# Patient Record
Sex: FEMALE | Race: WHITE | Employment: FULL TIME | URBAN - METROPOLITAN AREA
[De-identification: names, ages, dates, MRNs, and addresses within clinical notes are randomized per-mention and may not be internally consistent; named-entity substitution may affect disease eponyms.]

---

## 2021-08-03 ENCOUNTER — HOSPITAL ENCOUNTER (EMERGENCY)
Age: 46
Discharge: HOME | End: 2021-08-03
Payer: COMMERCIAL

## 2021-08-03 ENCOUNTER — APPOINTMENT (OUTPATIENT)
Dept: GENERAL RADIOLOGY | Age: 46
End: 2021-08-03
Payer: COMMERCIAL

## 2021-08-03 ENCOUNTER — APPOINTMENT (OUTPATIENT)
Dept: ULTRASOUND IMAGING | Age: 46
End: 2021-08-03
Payer: COMMERCIAL

## 2021-08-03 VITALS
RESPIRATION RATE: 16 BRPM | DIASTOLIC BLOOD PRESSURE: 89 MMHG | WEIGHT: 133 LBS | BODY MASS INDEX: 19.04 KG/M2 | HEIGHT: 70 IN | HEART RATE: 97 BPM | OXYGEN SATURATION: 100 % | SYSTOLIC BLOOD PRESSURE: 146 MMHG | TEMPERATURE: 97.3 F

## 2021-08-03 DIAGNOSIS — M54.16 LUMBAR RADICULOPATHY: ICD-10-CM

## 2021-08-03 DIAGNOSIS — A59.9 TRICHOMONAS INFECTION: ICD-10-CM

## 2021-08-03 DIAGNOSIS — M54.32 SCIATICA OF LEFT SIDE: Primary | ICD-10-CM

## 2021-08-03 DIAGNOSIS — M51.36 DEGENERATIVE LUMBAR DISC: ICD-10-CM

## 2021-08-03 DIAGNOSIS — S39.012A STRAIN OF LUMBAR REGION, INITIAL ENCOUNTER: ICD-10-CM

## 2021-08-03 LAB
BACTERIA: ABNORMAL /HPF
BILIRUBIN URINE: NEGATIVE
BLOOD, URINE: NEGATIVE
CLARITY: ABNORMAL
COLOR: YELLOW
EPITHELIAL CELLS, UA: ABNORMAL /HPF
GLUCOSE URINE: NEGATIVE MG/DL
HCG, URINE, POC: NEGATIVE
KETONES, URINE: 15 MG/DL
LEUKOCYTE ESTERASE, URINE: ABNORMAL
Lab: NORMAL
NEGATIVE QC PASS/FAIL: NORMAL
NITRITE, URINE: NEGATIVE
PH UA: 5.5 (ref 5–9)
POSITIVE QC PASS/FAIL: NORMAL
PROTEIN UA: NEGATIVE MG/DL
RBC UA: ABNORMAL /HPF (ref 0–2)
SPECIFIC GRAVITY UA: 1.02 (ref 1–1.03)
TRICHOMONAS: PRESENT /HPF
UROBILINOGEN, URINE: 0.2 E.U./DL
WBC UA: >20 /HPF (ref 0–5)

## 2021-08-03 PROCEDURE — 72100 X-RAY EXAM L-S SPINE 2/3 VWS: CPT

## 2021-08-03 PROCEDURE — 6360000002 HC RX W HCPCS: Performed by: NURSE PRACTITIONER

## 2021-08-03 PROCEDURE — 93971 EXTREMITY STUDY: CPT

## 2021-08-03 PROCEDURE — 87491 CHLMYD TRACH DNA AMP PROBE: CPT

## 2021-08-03 PROCEDURE — 99284 EMERGENCY DEPT VISIT MOD MDM: CPT

## 2021-08-03 PROCEDURE — 81001 URINALYSIS AUTO W/SCOPE: CPT

## 2021-08-03 PROCEDURE — 73502 X-RAY EXAM HIP UNI 2-3 VIEWS: CPT

## 2021-08-03 PROCEDURE — 96372 THER/PROPH/DIAG INJ SC/IM: CPT

## 2021-08-03 PROCEDURE — 87591 N.GONORRHOEAE DNA AMP PROB: CPT

## 2021-08-03 PROCEDURE — 87088 URINE BACTERIA CULTURE: CPT

## 2021-08-03 RX ORDER — METRONIDAZOLE 500 MG/1
500 TABLET ORAL 2 TIMES DAILY
Qty: 14 TABLET | Refills: 0 | Status: SHIPPED | OUTPATIENT
Start: 2021-08-03 | End: 2021-08-10

## 2021-08-03 RX ORDER — KETOROLAC TROMETHAMINE 30 MG/ML
30 INJECTION, SOLUTION INTRAMUSCULAR; INTRAVENOUS ONCE
Status: COMPLETED | OUTPATIENT
Start: 2021-08-03 | End: 2021-08-03

## 2021-08-03 RX ORDER — METRONIDAZOLE 500 MG/1
2000 TABLET ORAL ONCE
Status: DISCONTINUED | OUTPATIENT
Start: 2021-08-03 | End: 2021-08-03

## 2021-08-03 RX ORDER — NAPROXEN 500 MG/1
500 TABLET ORAL 2 TIMES DAILY WITH MEALS
Qty: 10 TABLET | Refills: 0 | Status: SHIPPED | OUTPATIENT
Start: 2021-08-03 | End: 2021-08-08

## 2021-08-03 RX ORDER — METHYLPREDNISOLONE 4 MG/1
TABLET ORAL
Qty: 1 KIT | Refills: 0 | Status: SHIPPED | OUTPATIENT
Start: 2021-08-03

## 2021-08-03 RX ORDER — LIDOCAINE 50 MG/G
1 PATCH TOPICAL EVERY 24 HOURS
Qty: 7 PATCH | Refills: 0 | Status: SHIPPED | OUTPATIENT
Start: 2021-08-03 | End: 2021-08-10

## 2021-08-03 RX ORDER — ORPHENADRINE CITRATE 30 MG/ML
60 INJECTION INTRAMUSCULAR; INTRAVENOUS ONCE
Status: COMPLETED | OUTPATIENT
Start: 2021-08-03 | End: 2021-08-03

## 2021-08-03 RX ADMIN — KETOROLAC TROMETHAMINE 30 MG: 30 INJECTION, SOLUTION INTRAMUSCULAR; INTRAVENOUS at 20:41

## 2021-08-03 RX ADMIN — ORPHENADRINE CITRATE 60 MG: 60 INJECTION INTRAMUSCULAR; INTRAVENOUS at 20:43

## 2021-08-03 ASSESSMENT — PAIN DESCRIPTION - PAIN TYPE: TYPE: ACUTE PAIN

## 2021-08-03 ASSESSMENT — PAIN SCALES - GENERAL
PAINLEVEL_OUTOF10: 5
PAINLEVEL_OUTOF10: 5

## 2021-08-03 NOTE — ED PROVIDER NOTES
[facility]  Department of Emergency Medicine   ED  Encounter Note  Admit Date/RoomTime: 8/3/2021  6:41 PM  ED Room:     NAME: Connie Matthews  : 1975  MRN: [id number]     Chief Complaint:  Hip Pain (left hip pain, calf pain x2 days )    History of Present Illness       Connie Matthews is a 41 y.o. old female with a prior history of recurrent intermittent self limited episodes of low back pain, presents to the emergency department by private vehicle, for non-traumatic acute on chronic, aching left sided lower lumbar spine pain with radiation, to the left lower leg, for 2 day(s) prior to arrival.  There has been no recent injury as it relates to today's visit, has been working long hours at work. Since onset the symptoms have been remaining constant and is moderate in severity. She has associated signs & symptoms of nothing additional.   She denies any bladder or bowel incontinence , new weakness, tingling or paresthesias, recent back injection, recent spinal surgery, recent spinal/chiropractic manipulation, history of IVDA, fever, abdominal pain, bladder incontinence, bowel incontinence, bladder retention, bladder urgency, bowel urgency, saddle paresthesias , sacral numbness or burning, numbness, tingling, dysuria, hematuria or urinary frequency. The pain is aggraveated by any movement and relieved by nothing in particular. She is not currently enrolled in an pain management program or managed by PCP or back specialist.  She denies any shortness breath, chest pain, lightheaded, dizziness or any trauma. 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  ROS   Pertinent positives and negatives are stated within HPI, all other systems reviewed and are negative. Past Medical History:  has a past medical history of Bronchitis and Sinusitis.     Surgical History:  has a past surgical history that includes  section. Social History:  reports that she has been smoking cigarettes. She has been smoking about 0.75 packs per day. She does not have any smokeless tobacco history on file. She reports that she does not drink alcohol and does not use drugs. Family History: family history is not on file. Allergies: Patient has no known allergies. Physical Exam   Oxygen Saturation Interpretation: Normal.        ED Triage Vitals   BP Temp Temp Source Pulse Resp SpO2 Height Weight   21 1839 21 1843 21 1843 21 1839 21 1839 21 18321 18321 183   (!) 146/89 97.3 °F (36.3 °C) Temporal 97 16 100 % 5' 10\" (1.778 m) 133 lb (60.3 kg)         Constitutional:  Alert, development consistent with age. HEENT:  NC/NT. Airway patent. Neck:  Normal ROM. Supple. Respiratory:  Clear to auscultation and breath sounds equal.  CV:  Regular rate and rhythm, normal heart sounds, without pathological murmurs, ectopy, gallops, or rubs. GI:  Abdomen Soft, nontender, good bowel sounds. No firm or pulsatile mass. Back: lower lumbar spine left sided. Tenderness: Moderate. Swelling: no.              Range of Motion: full range with pain. CVA Tenderness: No CVA tenderness. Straight leg raising:  Negative bilateral            Skin:  no wounds, erythema, or swelling. Distal Function:              Motor deficit: none. 5 out of 5 strength in bilateral lower extremities            Sensory deficit: none. Full sensation to light touch of bilateral lower extremities            Pulse deficit: none. Calf Tenderness:  No Bilateral.               Edema:  none Both lower extremity(s). Deep Tendon Reflexes (DTR's): Bilateral Knee/Patellar reflex (L2-L4) and Ankle/Achilles reflex (S1):  2 - normal  Gait:  normal without use of mobility aid. Integument:  Normal turgor. Warm, dry, without visible rash.   Lymphatics: No lymphangitis or adenopathy noted. Neurological:  Oriented. Motor functions intact. Lab / Imaging Results   (All laboratory and radiology results have been personally reviewed by myself)  Labs:  Results for orders placed or performed during the hospital encounter of 08/03/21   C.trachomatis N.gonorrhoeae DNA, Urine    Specimen: Urine   Result Value Ref Range    Source Urine    Urinalysis, reflex to microscopic   Result Value Ref Range    Color, UA Yellow Straw/Yellow    Clarity, UA SL CLOUDY Clear    Glucose, Ur Negative Negative mg/dL    Bilirubin Urine Negative Negative    Ketones, Urine 15 (A) Negative mg/dL    Specific Gravity, UA 1.020 1.005 - 1.030    Blood, Urine Negative Negative    pH, UA 5.5 5.0 - 9.0    Protein, UA Negative Negative mg/dL    Urobilinogen, Urine 0.2 <2.0 E.U./dL    Nitrite, Urine Negative Negative    Leukocyte Esterase, Urine LARGE (A) Negative   Microscopic Urinalysis   Result Value Ref Range    WBC, UA >20 (A) 0 - 5 /HPF    RBC, UA 5-10 (A) 0 - 2 /HPF    Epithelial Cells, UA MODERATE /HPF    Bacteria, UA MANY (A) None Seen /HPF    Trichomonas, UA Present (A) None Seen /HPF   POC Pregnancy Urine   Result Value Ref Range    HCG, Urine, POC Negative Negative    Lot Number [id number]     Positive QC Pass/Fail Pass     Negative QC Pass/Fail Pass        Imaging: All Radiology results interpreted by Radiologist unless otherwise noted. XR LUMBAR SPINE (2-3 VIEWS)   Final Result   Preserved vertebral body height and alignment. Minimal early lower lumbar   spine degenerative changes. XR HIP 2-3 VW W PELVIS LEFT   Final Result   No acute abnormality of the hip. US DUP LOWER EXTREMITY LEFT KATHLEEN   Final Result   No evidence of DVT in the left lower extremity.              ED Course / Medical Decision Making     Medications   ketorolac (TORADOL) injection 30 mg (30 mg Intramuscular Given 8/3/21 2041)   orphenadrine (NORFLEX) injection 60 mg (60 mg Intramuscular Given 8/3/21 2043) Re-examination:  8/3/21       Time: 2124-reviewed all results with patient. She has had no neurological deficits or neurovascular compromise. Discussed urine. She states she has had a small amount of foul-smelling green vaginal discharge that she did not think anything of it. She denies any urine complaints or abdominal pain. She refuses pelvic exam.  She agrees to adding on gonorrhea and chlamydia to her urine. She states she will wait for cultures instead being treated prophylactically for this. She declines any additional blood work or imaging. After shared decision making patient be discharged home with Flagyl for trichomonas, naproxen Medrol Dosepak and Lidoderm patches. Discussed appropriate use and potential side effects of starting these medications as well as not to take any other NSAIDs will take naproxen and not to drink alcohol during Flagyl treatment up to 72 hours after last dose. She states verbal understanding. Instructed patient not to drive at discharge receiving medications in the ER that can be sedating. Patients condition is improving after treatment. Consult(s):   None    Procedure(s):   none    Medical Decision Making:    Patient presents to the ED for left lower lumbar pain that radiates down her buttocks and down her left leg. Differential diagnoses included but not limited to DVT versus fracture versus location versus lumbar strain versus lumbar radiculopathy versus sciatica. Workup in the ED revealed urinalysis revealed large amount of leukocytes. There was trichomonas present. Urine culture was added. GC culture was also added. Patient does not want be treated prophylactically for gonorrhea and chlamydia. She declined pelvic exam.  She has no urinary complaints or abdominal pain, fevers or chills. There is no CVA tenderness. Urine pregnancy is negative. X-ray of the lumbar spine reveals preserved vertebral body height and alignment.   Minimal early lower lumbar spine degenerative changes. X-ray of left hip reveals no acute abnormalities of the hip. There was no neurovascular compromise or deficits. There was no major red flags of back pain including fevers, chills, bowel or bladder incontinence, urine retention, saddle anesthesia, numbness, weakness, tingling, IV drug use, history of cancer, or history of back surgery, or recent injections, recent chiropractic manipulation, shortness breath, abdominal pain, chest pain. She states she does lift and twist during her shifts and been working extra hours as an LPN. Patient was given Norflex and Toradol for their symptoms with moderate improvement. Patient continues to be non-toxic on re-evaluation. Findings were discussed with the patient and reasons to immediately return to the ED were articulated to them. They will follow-up with their PMD and gynecology. Plan of Care/Counseling:  HARINDER Cuello CNP reviewed today's visit with the patient in addition to providing specific details for the plan of care and counseling regarding the diagnosis and prognosis. Questions are answered at this time and are agreeable with the plan. Assessment      1. Sciatica of left side    2. Strain of lumbar region, initial encounter    3. Lumbar radiculopathy    4. Degenerative lumbar disc    5. Trichomonas infection      Plan   Discharged home.   Patient condition is stable    New Medications     Discharge Medication List as of 8/3/2021  9:26 PM      START taking these medications    Details   naproxen (NAPROSYN) 500 MG tablet Take 1 tablet by mouth 2 times daily (with meals) for 5 days, Disp-10 tablet, R-0Print      lidocaine (LIDODERM) 5 % Place 1 patch onto the skin every 24 hours for 7 days 12 hours on, 12 hours off., Disp-7 patch, R-0Print      methylPREDNISolone (MEDROL, LINDSEY,) 4 MG tablet Take as directed, Disp-1 kit, R-0Print      metroNIDAZOLE (FLAGYL) 500 MG tablet Take 1 tablet by mouth 2 times daily for 7 days, Disp-14 tablet, R-0Print           Electronically signed by HARINDER Duenas CNP   DD: 8/3/21  **This report was transcribed using voice recognition software. Every effort was made to ensure accuracy; however, inadvertent computerized transcription errors may be present.   END OF ED PROVIDER NOTE       HARINDER Duenas CNP  08/03/21 [id number]

## 2021-08-06 LAB
C. TRACHOMATIS DNA ,URINE: NEGATIVE
N. GONORRHOEAE DNA, URINE: NEGATIVE
SOURCE: NORMAL
URINE CULTURE, ROUTINE: NORMAL

## 2024-04-18 ENCOUNTER — OFFICE VISIT (OUTPATIENT)
Dept: PODIATRY | Age: 49
End: 2024-04-18
Payer: COMMERCIAL

## 2024-04-18 VITALS
SYSTOLIC BLOOD PRESSURE: 108 MMHG | DIASTOLIC BLOOD PRESSURE: 62 MMHG | BODY MASS INDEX: 19.08 KG/M2 | TEMPERATURE: 98.2 F | WEIGHT: 133 LBS

## 2024-04-18 DIAGNOSIS — Q82.8 POROKERATOSIS: Primary | ICD-10-CM

## 2024-04-18 DIAGNOSIS — M79.674 PAIN IN RIGHT TOE(S): ICD-10-CM

## 2024-04-18 PROCEDURE — 99202 OFFICE O/P NEW SF 15 MIN: CPT | Performed by: PODIATRIST

## 2024-04-18 RX ORDER — CHOLECALCIFEROL (VITAMIN D3) 125 MCG
5000 CAPSULE ORAL DAILY
COMMUNITY
Start: 2024-03-29

## 2024-04-19 NOTE — PROGRESS NOTES
Other Topics Concern    Not on file   Social History Narrative    Not on file     Social Determinants of Health     Financial Resource Strain: Not on file   Food Insecurity: Not on file   Transportation Needs: Not on file   Physical Activity: Not on file   Stress: Not on file   Social Connections: Not on file   Intimate Partner Violence: Not on file   Housing Stability: Not on file       Vitals:    04/18/24 1527   BP: 108/62   Temp: 98.2 °F (36.8 °C)   TempSrc: Temporal   Weight: 60.3 kg (133 lb)       Focused Lower Extremity Physical Exam:  Vitals:    04/18/24 1527   BP: 108/62   Temp: 98.2 °F (36.8 °C)        Foot Exam    General  General Appearance: appears stated age and healthy   Orientation: alert and oriented to person, place, and time       Right Foot/Ankle     Inspection and Palpation  Tenderness: bony tenderness and navicular   Skin Exam: skin changes, abnormal color and warts;     Neurovascular  Dorsalis pedis: 3+  Posterior tibial: 3+  Saphenous nerve sensation: normal  Tibial nerve sensation: normal  Superficial peroneal nerve sensation: normal  Deep peroneal nerve sensation: normal  Sural nerve sensation: normal  Achilles reflex: 2+  Babinski reflex: 2+    Muscle Strength  Ankle dorsiflexion: 5  Ankle plantar flexion: 5  Ankle inversion: 5  Ankle eversion: 5  Great toe extension: 5  Great toe flexion: 5    Range of Motion    Normal right ankle ROM             Right Ankle Exam     Range of Motion   The patient has normal right ankle ROM.    Muscle Strength   Dorsiflexion:  5/5  Plantar flexion:  5/5             Vascular: pulses  dp  pt palpable  Capillary Refill Time:   Hair growth  Skin:    Edema:    Neurologic:      Musculoskeletal/ Orthopedic examination:    NAIL   Web space   Derm: Plantar aspect of the right foot plantar to the navicular bone there is 2 porokeratosis no erythematous or drainage noted pain with palpation        Assessment and Plan: Today I debrided the lesions salicylic acid with

## 2024-05-08 ENCOUNTER — OFFICE VISIT (OUTPATIENT)
Dept: PODIATRY | Age: 49
End: 2024-05-08
Payer: COMMERCIAL

## 2024-05-08 DIAGNOSIS — M79.674 PAIN IN RIGHT TOE(S): ICD-10-CM

## 2024-05-08 DIAGNOSIS — Q82.8 POROKERATOSIS: Primary | ICD-10-CM

## 2024-05-08 PROCEDURE — 99212 OFFICE O/P EST SF 10 MIN: CPT | Performed by: PODIATRIST

## 2024-05-08 NOTE — PROGRESS NOTES
file   Intimate Partner Violence: Not on file   Housing Stability: Not on file       There were no vitals filed for this visit.    Focused Lower Extremity Physical Exam:  There were no vitals filed for this visit.     Foot Exam     Ortho Exam    Vascular: pulses  dp  pt    Capillary Refill Time:   Hair growth  Skin:    Edema:    Neurologic:      Musculoskeletal/ Orthopedic examination:    NAIL   Web space   Derm: The plantar aspect the right foot plantar to the navicular there is a 3 solid dry white lesions after debridement there is good healthy tissue no visible signs of the lesion.        Assessment and Plan: Patient is to DC all salicylic acid we will continue monitoring reappoint in 3-month  Vangie was seen today for follow-up and verruca vulgaris.    Diagnoses and all orders for this visit:    Porokeratosis    Pain in right toe(s)        No follow-ups on file.      Seen By:  Isai Strong DPM      Document was created using voice recognition software.  Note was reviewed, however may contain grammatical errors.